# Patient Record
Sex: MALE | Race: WHITE
[De-identification: names, ages, dates, MRNs, and addresses within clinical notes are randomized per-mention and may not be internally consistent; named-entity substitution may affect disease eponyms.]

---
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
## 2025-01-25 ENCOUNTER — HOSPITAL ENCOUNTER (EMERGENCY)
Dept: HOSPITAL 97 - ER | Age: 20
Discharge: HOME | End: 2025-01-25
Payer: COMMERCIAL

## 2025-01-25 DIAGNOSIS — M25.531: Primary | ICD-10-CM

## 2025-01-25 PROCEDURE — 99283 EMERGENCY DEPT VISIT LOW MDM: CPT

## 2025-01-25 NOTE — EDPHYS
Physician Documentation                                                                           

 [facility]                                                                 

Name: Eliseo Kohli                                                                            

Age: 19 yrs                                                                                       

Sex: Male                                                                                         

: 2005                                                                                   

MRN: [id number]                                                                                   

Arrival Date: 2025                                                                          

Time: 20:11                                                                                       

Account#: [id number]                                                                            

Bed IW1                                                                                           

Private MD:                                                                                       

SAMANTHA Physician Pramod Murphy                                                                      

HPI:                                                                                              

                                                                                             

20:45 This 19 yrs old Male presents to ER via Ambulatory with complaints of Hand Injury -     cp  

      right.                                                                                      

20:45 The patient or guardian reports injury, pain. The complaints affect the right wrist     cp  

      diffusely. Context: resulted from a fall, on an outstretched hand. Onset: The               

      symptoms/episode began/occurred yesterday. Associated signs and symptoms: Pertinent         

      positives: radiating pain to right hand, Pertinent negatives: cyanosis distally,            

      numbness distally.                                                                          

                                                                                                  

Historical:                                                                                       

- Allergies:                                                                                      

20:37 No Known Allergies;                                                                     br2 

- PMHx:                                                                                           

20:37 Asthma; Pneumonia; BRAIN TUMOR;                                                         br2 

- PSHx:                                                                                           

20:37 Brain sx;                                                                               br2 

                                                                                                  

- Immunization history:: Adult Immunizations up to date.                                          

- Infectious Disease History:: Denies.                                                            

- Social history:: Smoking status: Reported history of juuling and/or vaping. Patient             

  uses alcohol, but reports only rare drinking.                                                   

                                                                                                  

                                                                                                  

ROS:                                                                                              

20:50 MS/extremity: Positive for pain, of the right wrist, Negative for decreased range of    cp  

      motion, deformity, paresthesias,                                                            

20:50 Eyes: Negative for injury, pain, redness, and discharge,                                cp  

20:50 Constitutional: Negative for body aches, chills, fever,                                     

20:50 Neck: Negative for pain with movement, pain at rest,                                        

20:50 Back: Negative for pain at rest, pain with movement,                                        

20:50 Neuro: Negative for headache, numbness, weakness,                                           

20:50 All other systems are negative,                                                             

                                                                                                  

Exam:                                                                                             

20:55 Constitutional: The patient appears in no acute distress, alert, awake, well developed, cp  

      well nourished,                                                                             

20:55 Head/Face:  Normocephalic, atraumatic.                                                  cp  

20:55 Neck: ROM/movement: is normal, is supple, without pain, no range of motions             cp  

      limitations,                                                                                

20:55 Back: pain, is absent, ROM is normal,                                                       

20:55 Musculoskeletal/extremity: Extremities: noted in the right wrist and right hand: pain,      

      tenderness, There is no evidence of  decreased ROM, deformity, ROM: full passive range      

      of motion, in the right hand and right wrist, limited passive range of motion due to        

      pain, in the right wrist, Perfusion: the extremity is normally perfused throughout, the     

       right hand and right wrist Sensation intact.                                               

                                                                                                  

Vital Signs:                                                                                      

20:35  / 77; Pulse 90; Resp 18; Temp 97.2; Pulse Ox 98% ; Weight 108.86 kg; Height 6    br2 

      ft. 0 in. ; Pain 3/10;                                                                      

20:35 Body Mass Index 32.55 (108.86 kg, 182.88 cm) - Percentile 97.5 %                        br2 

20:35 Pain Scale: Adult                                                                       br2 

                                                                                                  

MDM:                                                                                              

20:23 Medical Screening Exam initiated                                                        cp  

21:00 Differential diagnosis: dislocation, sprain, fracture.                                  cp  

21:18 Data reviewed: vital signs, nurses notes, radiologic studies, plain films, and as a     cp  

      result, I will discharge patient.                                                           

21:18 I considered the following discharge prescriptions or medication management in the      cp  

      emergency department Medications were administered in the Emergency Department. See         

      MAR. Counseling: I had a detailed discussion with the patient and/or guardian regarding     

      the historical points, exam findings, and any diagnostic results supporting the             

      discharge/admit diagnosis, radiology results, to return to the emergency department if      

      symptoms worsen or persist or if there are any questions or concerns that arise at          

      home. Response to treatment: the patient's symptoms have mildly improved after              

      treatment, and as a result, I will discharge patient.                                       

                                                                                                  

                                                                                             

20:41 Order name: Wrist Right 3 View XRAY; Complete Time: 21:10                               br2 

                                                                                             

21:10 Interpretation: Report reviewed.                                                        cp  

                                                                                             

21:10 Order name: Wrist Splint; Complete Time: 21:44                                          cp  

                                                                                                  

Administered Medications:                                                                         

21:24 Drug: Ibuprofen  mg PO once Route: PO;                                            br2 

21:26 Follow up: Response: Medication administered at discharge.                              br2 

                                                                                                  

                                                                                                  

Disposition Summary:                                                                              

25 21:19                                                                                    

Discharge Ordered                                                                                 

 Notes:       Location: Home                                                                        
  cp

      Problem: new                                                                            cp  

      Symptoms: have improved                                                                 cp  

      Condition: Stable                                                                       cp  

      Diagnosis                                                                                   

        - Pain in right wrist                                                                 cp  

      Followup:                                                                               cp  

        - With: Misha Brito MD                                                                  

        - When: 5 - 6 days                                                                         

        - Reason: pain continues                                                                   

      Discharge Instructions:                                                                     

        - Discharge Summary Sheet                                                             cp  

        - Wrist Pain, Adult                                                                   cp  

      Forms:                                                                                      

        - Medication Reconciliation Form                                                      cp  

        - Antibiotic Education                                                                cp  

        - Prescription Opioid Use                                                             cp  

        - Patient Portal Instructions                                                         cp  

        - Leadership Thank You Letter                                                         cp  

        - Work release form                                                                   br2 

      Prescriptions:                                                                              

        - Ibuprofen 800 mg Oral Tablet                                                             

            - take 1 tablet ORAL route every 8 hours As needed take with food; 30 tablet;     cp  

      Refills: 0, Product Selection Permitted                                                     

Addendum:                                                                                         

2025                                                                                        

     14:16 Co-signature as Attending Physician, Pramod Murphy MD I agree with the assessment and  c
ha

           plan of care.                                                                          

                                                                                                  

Signatures:                                                                                       

Dispatcher [employer]                           EDPramod Mirza MD MD cha Page, Corey, PA                         PA   Melissa Retana, RN                     RN   br2                                                  

                                                                                                  

**************************************************************************************************

## 2025-01-25 NOTE — XMS REPORT
Continuity of Care Document



                          Created on: 2025





ELISEO KOHLI

External Reference #: [id number]

: 2005

Sex: Male



Demographics





                                        Address             [address] [place] [address]  [address]

 

                                        Home Phone          [phone]

 

                                        Mobile Phone        [phone]

 

                                        Email Address       [email or web address]

 

                                        Preferred Language  English

 

                                        Marital Status      Unknown

 

                                        Zoroastrian Affiliation Unknown

 

                                        Race                Unknown

 

                                        Additional Race(s)  Unavailable

 

                                        Ethnic Group        Unknown





Author





                                        Name                Unknown

 

                                        Address             [address]

[place]  [address]

 

                                        [facility]

 

                                        Address             [address]

[address]

[place]  [address]

 

                                        Phone               [phone]





Support





                          Name         Relationship Address      Phone

 

                                [employer]EL[employer]AJU[employer]               [address] [place] [address]  [address]                     [phone]

 

                                Eliseo Kohli Personal Relationship [address] [place] [address]  [id number] [phone]





Care Team Providers





                                Care Team Member Name Role            Phone

 

                                PCP, PATIENT DOES NOT HAVE A Primary Care Physic

sara Unavailable

 

                                Jonathan Keene Attending Clinician Unavailable

 

                                UNKNOWN, ATTENDING Attending Clinician Unavailab

Shawanda Holman PA-C Attending Clinician [phone]

 

                                SHAWANDA MCINTYRE Attending Clinician Unavailable

 

                                Unknown, Attending Attending Clinician Unavailab

le

 

                                Doctor Unassigned, No Name Attending Clinician U

navailable







Payers





                    Payer Name Policy Type Policy Number Effective Date Expirati

on Date Source

 

                                                    [employer] [place] [employer] 

[employer]                            [id number]        2024-10-01 

00:00:00                                            

 

                                                    [facility]                  [id number]           2020 

00:00:00                                            [facility]                  [id number]           2020 

00:00:00                                            [facility]                  [id number]           2020 

00:00:00                                            [facility]







Problems





                                                    Condition 

Name                                    Condition 

Details                                 Condition 

Category                  Status                    Onset 

Date                                    Resolution 

Date                                    Last 

Treatment 

Date                                    Treating 

Clinician                 Comments                  Source

 

                                        [id number]           Repetitive 

intrusions 

of sleep Problem Active                                          [facility]

 

                                                    [id number]

00                                      Daytime 

somnolence Problem Active                                          [facility]

 

                                        [id number]           Mild 

intermitte

nt asthma 

without 

complicati

on      Problem Active                                          [facility]

 

                                        [id number]           Brain 

tumor   Problem Active                                          [facility]

 

                                        [id number]            Obesity, 

unspecifie

d       Problem Active                                          [facility]

 

                                        [id number]             Diverticul

itis of 

large 

intestine, 

unspecifie

d bleeding 

status, 

unspecifie

d 

complicati

on status Problem Active                                          [facility]

 

                                        [id number]           Diverticul

itis    Problem Active                                          [facility]







Allergies, Adverse Reactions, Alerts





                                                    Allergy 

Name                                    Allergy 

Type            Status          Severity        Reaction(s)     Onset 

Date                                    Inactive 

Date                                    Treating 

Clinician                 Comments                  Source

 

                                                    NO KNOWN 

ALLERGIE

S                                       Drug 

Class   Active                                                  [facility]







Social History





                    Social Habit Start Date Stop Date Quantity  Comments  Source

 

                                                    History of Tobacco 

Use                                    Current Smoker              [facility]

 

                    Sexual orientation                                         U

[facility]

 

                                                    History of Social 

function                                2020 

00:00:00                                2020 

00:00:00                                                    [facility]

 

                                                    Tobacco use and 

exposure                                2019 

00:00:00                                2019 

00:00:00                                Smokeless 

tobacco non-user                                    [facility]

 

                                                    Sex Assigned At 

Birth                                   2005 

00:00:00                                2005 

00:00:00                                                    [facility]







                          Smoking Status Start Date   Stop Date    Source

 

                          Current Smoker 2020 00:00:00              [facility]

 

                          Never smoked tobacco                           [facility]







Medications





                                                    Ordered 

Medication 

Name                                    Filled 

Medication 

Name                                    Start 

Date                                    Stop 

Date                                    Current 

Medication?                             Ordering 

Clinician       Indication      Dosage          Frequency       Signature 

(SIG)               Comments            Components          Source

 

                                                    bromphenira

mine-pseudo

ephedrine-D

M (BROMFED 

DM) 2-30-10 

mg/5 mL 

syrup                                               2023-1

2-15 

00:00:

00                  Yes                 [id number] 5mL                 Take 5 mL 

by mouth 3 

(three) 

times 

daily as 

needed for 

Cold 

symptoms 

or Cough.                                                   [facility]

 

                                                    cetirizine 

10 mg 

tablet                                              2023-1

2-15 

00:00:

00                  Yes                 [id number] 10mg                Take 1 

tablet by 

mouth in 

the 

morning.                                                    [facility]

 

                                                    fluticasone 

propionate 

50 

mcg/actuati

on nasal 

spray                                               2023-1

2-15 

00:00:

00                        Yes                       [id number]    2{spray

}                                                   Use 2 

Sprays in 

each 

nostril in 

the 

morning.                                                    [facility]

 

                                                    azelastine 

137 mcg 

(0.1 %) 

nasal spray                                         2023-1

2-15 

00:00:

00                        Yes                       [id number]    1{spray

}                                                   Use 1 

Spray in 

each 

nostril in 

the 

morning 

and 1 

Spray in 

the 

evening. 

Use in 

each 

nostril as 

directed                                                    [facility]

 

                                ProAir HFA      ProAir HFA      

1-13 

00:00:

00                                      Yes                 Jonathan 

Keene                                                           2 puffs as 

needed                                                      [facility]

 

                                                    ProAir HFA 

108 (90 

Base) 

MCG/ACT                                 ProAir HFA 

108 (90 

Base) 

MCG/ACT                          No                               2{puffs

_as_nee

ded}                                                ProAir HFA 

108 (90 

Base) 

MCG/ACT                                                     







Vital Signs





                      Vital Name Observation Time Observation Value Comments   S

ource

 

                                                    Systolic blood 

pressure        2023-12-15 21:31:00 138 mm[Hg]                      [facility]

 

                                                    Diastolic blood 

pressure        2023-12-15 21:31:00 83 mm[Hg]                       [facility]

 

                      Heart rate 2023-12-15 21:31:00 102 /min              [facility]

 

                      Body temperature 2023-12-15 21:31:00 36.61 Martha            

 [facility]

 

                      Respiratory rate 2023-12-15 21:31:00 17 /min              

 [facility]

 

                      Body height 2023-12-15 21:31:00 182.9 cm              [facility]

 

                      Body weight 2023-12-15 21:31:00 106.397 kg            [facility]

 

                      BMI        2023-12-15 21:31:00 31.81 kg/m2            [facility]

 

                                                    Body mass index 

(BMI) [Percentile] 

Per age and sex 2023-12-15 21:31:00 96.41 %                         [facility]

 

                                                    Oxygen saturation in 

Arterial blood by 

Pulse oximetry  2023-12-15 21:31:00 99 /min                         [facility]

 

                      height     2020 16:20:00 69.5 [in_i]            Comm

on [facility]

 

                      weight     2020 16:20:00 280 [lb_av]            Comm

on [facility]

 

                      bmi        2020 16:20:00 40.75 kg/m2            Comm

on [facility]

 

                      height     2020 11:30:00 69.5 [in_i]            Comm

on [facility]

 

                      weight     2020 11:30:00 278.3 [lb_av]            Co

mmon [facility]

 

                      temperature 2020 11:30:00 97.7 [degF]            Com

mon [facility]

 

                      bmi        2020 11:30:00 40.5 kg/m2            Commo

n [facility]

 

                      oximetry   2020 11:30:00 98 %                  Commo

n [facility]

 

                      respiratory rate 2020 11:30:00 16 /min              

 [facility]

 

                                                    blood pressure 

systolic        2020 11:30:00 137 mm[Hg]                      [facility]

 

                                                    blood pressure 

diastolic       2020 11:30:00 74 mm[Hg]                       [facility]







Procedures





                          Procedure    Date / Time Performed Performing Clinicia

n Source

 

                          POCT MOLECULAR FLU 2023-12-15 21:42:00 Unknown, Attend

ing [facility]

 

                          POCT MOLECULAR STREP 2023-12-15 21:40:00 Unknown, Atte

olu [facility]

 

                                                    POCT SARS-COV-2 

ANTIGEN (BINAX NOW) 2023-12-15 21:38:00 Anjelica Goodson      [facility]

 

                                ASSIGNMENT OF BENEFITS 2023-12-15 21:22:49 Docto

r Unassigned, No 

Name                                    [facility]







Encounters





                                                    Start 

Date/Time                               End 

Date/Time                               Encounter 

Type                                    Admission 

Type                                    Attending 

Clinicians                              Care 

Facility                                Care 

Department                              Encounter 

ID                                      Source

 

                                                    2022 

14:25:17                        Outpatient                      Keene 

[facility]              [id number]

[id number]                                   [facility]

 

                                                    2022 

11:24:51                        Outpatient                      Keene, 

[facility]              [id number]

[id number]                                   [facility]

 

                                                    2022 

11:17:54                        Outpatient                      Keene, 

[facility]              [id number]

[id number]                                   [facility]

 

                                                    2022 

11:17:40                        Outpatient                      [facility], 

[facility]              [id number]

[id number]                                   [facility] 

[facility]

 

                                                    2022 

11:06:01                        Outpatient                      [facility]ene, 

[facility]              [id number]

[id number]                                   [facility] 

[facility]

 

                                                    2022 

11:03:05                        Outpatient                      Keene, 

[facility]              [id number]

[id number]                                   [facility]

 

                                                    2022 

10:59:58                        Outpatient                      Keene, 

[facility]              [id number]

[id number]                                   [facility]

 

                                                    2025 

11:45:00                                2025 

11:45:00            Outpatient          R                   UNKNOWN, 

ATTENDING       [facility]            [id number]      [facility]

 

                                                    2024 

00:00:00                                2024 

00:00:00            Refill                                  Arvind 

[facility] 

MEDICAL 

OFFICE 

BUILDING                                [id number]

[id number]

[id number]

[id number]

370                       [id number]                 [facility]

 

                                                    2024 

00:00:00                                2024 

00:00:00            Refill                                  Arvind 

[facility] 

MEDICAL 

OFFICE 

BUILDING                                [id number]

[id number]

[id number]

[id number]

370                       [id number]                 [facility]

 

                                                    2023-12-15 

15:20:00                                2023-12-15 

16:02:36            Outpatient          R                   SHAWANDA MCINTYRE           [facility]            [id number]      [facility]

 

                                                    2023-12-15 

15:20:00                                2023-12-15 

15:40:00                                Urgent 

Care                                                CiscoandrewShawanda

Unknown, 

Attending                               [facility] 

MEDICAL 

OFFICE 

BUILDING                                [id number]

[id number]

[id number]

[phone]

370                       [id number]                 [facility]

 

                                                    2023-12-15 

00:00:00                                2023-12-15 

00:00:00                                Orders 

Only                                                Doctor 

Unassigned, 

No Name                                 [facility]                                [id number]

[id number]

[id number]

[phone]

[id number]                       [id number]                 [facility]

 

                                                    2020 

00:00:00                                2020 

00:00:00                                OFFICE 

VISIT EST 

PT LEVEL 3                       [facility]     [facility]     [id number]    [facility]

 

                                                    2020 

00:00:00                                2020 

00:00:00  (TEL)                         [facility]    [facility]    [id number]   [facility]

 

                                                    2020 

00:00:00                                2020 

00:00:00                                OFFICE 

VISIT EST 

PT LEVEL 3                       [facility]     [facility]     [id number]    [facility]

 

                                                    2020 

14:50:00                                2020 

14:50:00        Outpatient                                      [facility]

t [place] 

[facility] 

Family 

Medicine                                [facility] 

Family 

Medicine                  [id number]                   [facility]

 

                                                    2020 

15:00:00                                2020 

15:00:00        Outpatient                                      [facility]

t [facility] 

Family 

Medicine                                [facility] 

Medicine                  [id number]                   [facility]

 

                                                    2020 

08:13:00                                2020 

08:13:00        Outpatient                                      [facility]

t [facility] 

Family 

Medicine                                [facility] 

Family 

Medicine                  [id number]                   [facility]

 

                                                    2020 

09:54:00                                2020 

09:54:00        Outpatient                                      [facility]

t [place] 

[facility] 

Family 

Medicine                                [facility] 

[facility]                  [id number]                   [facility]

 

                                                    2020 

09:54:00                                2020 

09:54:00        Outpatient                                      [facility]

t [place] 

[facility] 

Family 

Medicine                                [facility] 

Family 

Medicine                  [id number]                   [facility]

 

                                                    2020 

06:56:00                                2020 

06:56:00        Outpatient                                      [facility]

t [place] 

[facility] 

Family 

Medicine                                [facility] 

Family 

Medicine                  [id number]                   [facility]

 

                                                    2020 

09:15:00                                2020 

09:15:00        Outpatient                                      [facility]

t [place] 

[facility]                  [id number]                   [facility] 

- [facility]

 

                                                    2020 

08:30:00                                2020 

08:30:00        Outpatient                                      [facility]

t [place] 

[place] 

Medicine                                [facility]                  [id number]                   [facility] 

- [facility]

 

                                                    2020 

08:23:00                                2020 

08:23:00        Outpatient                                      [facility]

t [place] 

[facility]                  [id number]                   [facility] 

[facility]

 

                                                    2020 

09:00:00                                2020 

09:00:00        Outpatient                                      [facility]

t [place] 

[facility]                  [id number]                   [facility] 

- [facility]

 

                                                    2020 

10:30:00                                2020 

10:30:00        Outpatient                                      [facility]

t [place] [facility]                  [id number]                   [facility]

 

                                                    2020 

11:00:00                                2020 

11:00:00        Outpatient                                      [facility]

t [facility]                  [id number]                   [facility]







Results





                    Test Description Test Time Test Comments Results   Result Co

mments Source







                                        

 

                                        

 

                                        

 

                                        

 

                                        





[facility] SARS-COV-2 ANTIGEN (BINAX NOW)2023-12-15 
21:53:00* 



                      Test Item  Value      Reference Range Interpretation Comme

nts

 

                                                    POCT SARS-COV-2 ANTIGEN (niko

t 

code = 94701-0) Not Detected    Not Detected                    

 

                                                    On board controls acceptable

 

with C Line (test code = 3574) Yes                                             

 

                                                    Lab Interpretation (test cod

e = 

62802-5)        Normal                                          





[facility] MOLECULAR STREP2023-12-15 21:49:14* 



                      Test Item  Value      Reference Range Interpretation Comme

nts

 

                                                    POCT Molecular Strep (test c

ode = 

48197-5)        Negative        Negative                        

 

                                                    Lab Interpretation (test cod

e = 

08482-9)        Normal                                          





[facility] 



                      Test Item  Value      Reference Range Interpretation Comme

nts

 

                      Ferritin, Serum (test code = 2276-4) 126                  

            





CBC With Differential/Platelet* 



                      Test Item  Value      Reference Range Interpretation Comme

nts

 

                      WBC (test code = 6690-2) 8.1                              

 

                      RBC (test code = 789-8) 5.39                             

 

                      Hemoglobin (test code = 718-7) 14.8                       

      

 

                      Hematocrit (test code = 4544-3) 45.7                      

       

 

                      MCV (test code = 787-2) 85                               

 

                      MCH (test code = 785-6) 27.5                             

 

                      MCHC (test code = 786-4) 32.4                             

 

                      RDW (test code = 788-0) 13.2                             

 

                      Platelets (test code = 777-3) 293                         

     

 

                      Neutrophils (test code = 770-8) 58                        

       

 

                      Lymphs (test code = 736-9) 31                             

  

 

                      Monocytes (test code = 5905-5) 8                          

      

 

                      Eos (test code = 713-8) 2                                

 

                      Basos (test code = 706-2) 1                               

 

 

                      Immature Cells (test code = UNLOINC)                      

            

 

                                                    Neutrophils (Absolute) (test

 code = 

751-8)          4.7                                             

 

                      Lymphs (Absolute) (test code = 731-0) 2.5                 

             

 

                      Monocytes(Absolute) (test code = 742-7) 0.6               

               

 

                      Eos (Absolute) (test code = 711-2) 0.2                    

          

 

                      Baso (Absolute) (test code = 704-7) 0.0                   

           

 

                                                    Immature Granulocytes (test 

code = 

65146-5)        0                                               

 

                                                    Immature Grans (Abs) (test c

ode = 

67687-2)        0.0                                             

 

                      NRBC (test code = 66695-6)                                

  

 

                                                    Hematology Comments: (test c

ode = 

89822-2)

## 2025-01-25 NOTE — ER
Nurse's Notes                                                                                     

 [facility]                                                                 

Name: Eliseo Kohli                                                                            

Age: 19 yrs                                                                                       

Sex: Male                                                                                         

: 2005                                                                                   

MRN: [id number]                                                                                   

Arrival Date: 2025                                                                          

Time: 20:11                                                                                       

Account#: [id number]                                                                            

Bed IW1                                                                                           

Private MD:                                                                                       

Diagnosis: Pain in right wrist                                                                    

                                                                                                  

Presentation:                                                                                     

                                                                                             

20:35 Chief complaint: Patient states: S/P FALL YESTERDAY AND ATTEMPTED TO CATCH HIMSELF, C/O br2 

      RIGHT WRIST PAIN. Coronavirus screen: Client denies travel out of the [place] in the last      

      14 days. Ebola Screen: Patient denies exposure to infectious person. Initial Sepsis         

      Screen: Does the patient meet any 2 criteria? No. Patient's initial sepsis screen is        

      negative. Does the patient have a suspected source of infection? No. Patient's initial      

      sepsis screen is negative. Risk Assessment: Do you want to hurt yourself or someone         

      else? Patient reports no desire to harm self or others. Onset of symptoms was 2025.                                                                                   

20:35 Method Of Arrival: Ambulatory                                                           br2 

20:35 Acuity: DONALD 4                                                                           br2 

                                                                                                  

Triage Assessment:                                                                                

20:37 General: Appears in no apparent distress. distressed, Behavior is calm, cooperative.    br2 

      Pain: Complains of pain in right wrist. Musculoskeletal: Capillary refill < 3 seconds,      

      Range of motion: intact in all extremities, Swelling absent Reports pain in right           

      wrist. Injury Description: HYPER EXTENDED.                                                  

                                                                                                  

Historical:                                                                                       

- Allergies:                                                                                      

20:37 No Known Allergies;                                                                     br2 

- PMHx:                                                                                           

20:37 Asthma; Pneumonia; BRAIN TUMOR;                                                         br2 

- PSHx:                                                                                           

20:37 Brain sx;                                                                               br2 

                                                                                                  

- Immunization history:: Adult Immunizations up to date.                                          

- Infectious Disease History:: Denies.                                                            

- Social history:: Smoking status: Reported history of juuling and/or vaping. Patient             

  uses alcohol, but reports only rare drinking.                                                   

                                                                                                  

                                                                                                  

Screenin:35 [facility] ED Fall Risk Assessment (Adult) History of falling in the last 3 months,       br2 

      including since admission No falls in past 3 months (0 pts) Confusion or Disorientation     

      No (0 pts) Intoxicated or Sedated No (0 pts) Impaired Gait No (0 pts) Mobility Assist       

      Device Used No (0 pt) Altered Elimination No (0 pt) Score/Fall Risk Level 0 - 2 = Low       

      Risk Oriented to surroundings. Abuse screen: Denies threats or abuse. Denies injuries       

      from another. Nutritional screening: No deficits noted. Tuberculosis screening: No          

      symptoms or risk factors identified.                                                        

                                                                                                  

Vital Signs:                                                                                      

20:35  / 77; Pulse 90; Resp 18; Temp 97.2; Pulse Ox 98% ; Weight 108.86 kg; Height 6    br2 

      ft. 0 in. ; Pain 3/10;                                                                      

20:35 Body Mass Index 32.55 (108.86 kg, 182.88 cm) - Percentile 97.5 %                        br2 

20:35 Pain Scale: Adult                                                                       br2 

                                                                                                  

ED Course:                                                                                        

20:12 Patient arrived in ED.                                                                  im  

20:22 Pramod Vidales PA is PHCP.                                                                cp  

20:22 Pramod Murphy MD is Attending Physician.                                             cp  

20:35 Patient has correct armband on for positive identification. Provided Education on: PLAN br2 

      OF CARE.                                                                                    

20:37 Triage completed.                                                                       br2 

20:37 Arm band placed on left wrist.                                                          br2 

20:39 Melissa Delacruz RN is Primary Nurse.                                                   br2 

21:03 Wrist Right 3 View XRAY In Process Unspecified.                                         EDMS

21:18 Misha Brito MD is Referral Physician.                                                cp  

21:26 No provider procedures requiring assistance completed. Patient did not have IV access   br2 

      during this emergency room visit.                                                           

21:43 WRIST SPLINT APPLIED TO RIGHT WRIST.                                                    br2 

                                                                                                  

Administered Medications:                                                                         

21:24 Drug: Ibuprofen  mg PO once Route: PO;                                            br2 

21:26 Follow up: Response: Medication administered at discharge.                              br2 

                                                                                                  

                                                                                                  

Medication:                                                                                       

21:43 VIS not applicable for this client.                                                     br2 

                                                                                                  

Outcome:                                                                                          

21:19 Discharge ordered by MD.                                                                cp  

21:26 Discharged to home ambulatory,                                                          br2 

21:26 Condition: good                                                                             

21:26 Discharge instructions given to patient, Instructed on discharge instructions, follow       

      up and referral plans. Demonstrated understanding of instructions, follow-up care,          

      medications, Prescriptions given X 1,                                                       

21:44 Patient left the ED.                                                                    br2 

                                                                                                  

Signatures:                                                                                       

Dispatcher [employer]                           EDMS                                                 

Pramod Vidales PA PA   cp                                                   

Gerri Steinberg                               im                                                   

Melissa Delacruz RN                     RN   br2                                                  

                                                                                                  

**************************************************************************************************

## 2025-01-25 NOTE — RAD REPORT
Exam:Wrist Right 3 View



HISTORY: Right wrist pain



FINDINGS:



No fracture or dislocation seen



If the patient continues to have symptoms to suggest an occult fracture then follow-up x-ray in 7 day
s would be recommended



Reported By: Denzel Millard 

Electronically Signed:  1/25/2025 9:06 PM

## 2025-01-26 VITALS — SYSTOLIC BLOOD PRESSURE: 161 MMHG | DIASTOLIC BLOOD PRESSURE: 77 MMHG | TEMPERATURE: 97.2 F | OXYGEN SATURATION: 98 %
